# Patient Record
(demographics unavailable — no encounter records)

---

## 2024-11-22 NOTE — ASSESSMENT
[FreeTextEntry1] : A/P Patient with left upper quadrant pain-likely secondary to some mild constipation I told daughter to give patient Benefiber daily.  Can increase to twice daily if no improvement of symptom Follow-up in 3-month

## 2024-11-22 NOTE — HISTORY OF PRESENT ILLNESS
[FreeTextEntry1] : Patient here with her daughter.  Sarahi helped as the .  Patient was recently admitted to [facility] with hyponatremia and shortness of breath as per daughter.  Patient intermittently complains of left upper quadrant discomfort.  Apparently she was seen in 2021 for abdominal pain and bloating.  CAT scan in 2019 showed increased fecal load.  I reviewed records from [facility].  Hemoglobin 12.7.  CAT scan of the abdomen showed right-sided stool, diverticulosis hiatal hernia.  As per daughter patient has irregular bowel movements.  She has 3-4 small bowel movements a day or can have bowel movements every other day.  No rectal bleeding

## 2025-02-07 NOTE — REASON FOR VISIT
[Follow-up] : a follow-up of an existing diagnosis [FreeTextEntry1] : Constipation left upper quadrant pain

## 2025-02-07 NOTE — ASSESSMENT
[FreeTextEntry1] : A/P Left upper quadrant pain, constipation Symptoms are improved with Benefiber.  May take Benefiber 2 teaspoons twice daily Can take Gas-X or simethicone as needed left upper quadrant pain Follow-up in 3 months

## 2025-02-07 NOTE — HISTORY OF PRESENT ILLNESS
[FreeTextEntry1] : Patient here with her daughter.  Daughter was the .  Patient with chronic left upper discomfort.  Chronic mild constipation.  Recent CAT scan last year showed stool burden in the right colon, diverticulosis hiatal hernia.  Normally having 3-4 small bowel movements a day.  She started Benefiber 1-2 times a day and is having larger bowel movements.  Still intermittently gets some mild left upper quadrant discomfort.

## 2025-02-12 NOTE — PHYSICAL EXAM
[Frail] : frail [Normal Conjunctiva] : normal conjunctiva [No Carotid Bruit] : no carotid bruit [Normal S1, S2] : normal S1, S2 [No Murmur] : no murmur [Clear Lung Fields] : clear lung fields [Soft] : abdomen soft [Normal Gait] : normal gait [No Edema] : no edema [No Rash] : no rash [Moves all extremities] : moves all extremities [Alert and Oriented] : alert and oriented

## 2025-02-12 NOTE — DISCUSSION/SUMMARY
[FreeTextEntry1] : 1) LE edema: Ordered Lasix 20mg PRN, and Spironolactone 25mg QD., BMP ordered for 2 weeks.  2) Mitral regurgitation: moderate: lasix as needed  3) HFrEF: EF 45%. No CP. Spironolactone 25mg QD.  [EKG obtained to assist in diagnosis and management of assessed problem(s)] : EKG obtained to assist in diagnosis and management of assessed problem(s)